# Patient Record
Sex: FEMALE | Race: BLACK OR AFRICAN AMERICAN | Employment: OTHER | URBAN - METROPOLITAN AREA
[De-identification: names, ages, dates, MRNs, and addresses within clinical notes are randomized per-mention and may not be internally consistent; named-entity substitution may affect disease eponyms.]

---

## 2021-07-11 ENCOUNTER — APPOINTMENT (OUTPATIENT)
Dept: CT IMAGING | Age: 66
End: 2021-07-11
Attending: PHYSICIAN ASSISTANT
Payer: COMMERCIAL

## 2021-07-11 ENCOUNTER — APPOINTMENT (OUTPATIENT)
Dept: GENERAL RADIOLOGY | Age: 66
End: 2021-07-11
Attending: PHYSICIAN ASSISTANT
Payer: COMMERCIAL

## 2021-07-11 ENCOUNTER — HOSPITAL ENCOUNTER (EMERGENCY)
Age: 66
Discharge: HOME | End: 2021-07-11
Payer: COMMERCIAL

## 2021-07-11 VITALS
DIASTOLIC BLOOD PRESSURE: 102 MMHG | HEIGHT: 57 IN | WEIGHT: 162 LBS | BODY MASS INDEX: 34.95 KG/M2 | HEART RATE: 101 BPM | RESPIRATION RATE: 16 BRPM | OXYGEN SATURATION: 99 % | SYSTOLIC BLOOD PRESSURE: 193 MMHG | TEMPERATURE: 98.2 F

## 2021-07-11 DIAGNOSIS — S09.90XA CLOSED HEAD INJURY, INITIAL ENCOUNTER: ICD-10-CM

## 2021-07-11 DIAGNOSIS — S50.02XA CONTUSION OF LEFT ELBOW, INITIAL ENCOUNTER: ICD-10-CM

## 2021-07-11 DIAGNOSIS — Z23 NEED FOR TETANUS BOOSTER: ICD-10-CM

## 2021-07-11 DIAGNOSIS — T07.XXXA ABRASIONS OF MULTIPLE SITES: ICD-10-CM
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
## 2021-07-11 DIAGNOSIS — S80.02XA CONTUSION OF LEFT KNEE, INITIAL ENCOUNTER: ICD-10-CM

## 2021-07-11 PROCEDURE — 90715 TDAP VACCINE 7 YRS/> IM: CPT | Performed by: PHYSICIAN ASSISTANT

## 2021-07-11 PROCEDURE — 99283 EMERGENCY DEPT VISIT LOW MDM: CPT

## 2021-07-11 PROCEDURE — 74011250636 HC RX REV CODE- 250/636: Performed by: PHYSICIAN ASSISTANT

## 2021-07-11 PROCEDURE — 72125 CT NECK SPINE W/O DYE: CPT

## 2021-07-11 PROCEDURE — 70450 CT HEAD/BRAIN W/O DYE: CPT

## 2021-07-11 PROCEDURE — 73080 X-RAY EXAM OF ELBOW: CPT

## 2021-07-11 PROCEDURE — 74011250637 HC RX REV CODE- 250/637: Performed by: PHYSICIAN ASSISTANT

## 2021-07-11 PROCEDURE — 90471 IMMUNIZATION ADMIN: CPT

## 2021-07-11 PROCEDURE — 73562 X-RAY EXAM OF KNEE 3: CPT

## 2021-07-11 RX ORDER — IBUPROFEN 600 MG/1
600 TABLET ORAL
Qty: 20 TABLET | Refills: 0 | Status: SHIPPED | OUTPATIENT
Start: 2021-07-11 | End: 2021-07-18

## 2021-07-11 RX ORDER — AMOXICILLIN AND CLAVULANATE POTASSIUM 875; 125 MG/1; MG/1
1 TABLET, FILM COATED ORAL
Status: COMPLETED | OUTPATIENT
Start: 2021-07-11 | End: 2021-07-11

## 2021-07-11 RX ORDER — HYDROCODONE BITARTRATE AND ACETAMINOPHEN 5; 325 MG/1; MG/1
1 TABLET ORAL
Status: COMPLETED | OUTPATIENT
Start: 2021-07-11 | End: 2021-07-11

## 2021-07-11 RX ORDER — AMOXICILLIN AND CLAVULANATE POTASSIUM 875; 125 MG/1; MG/1
1 TABLET, FILM COATED ORAL 2 TIMES DAILY
Qty: 14 TABLET | Refills: 0 | Status: SHIPPED | OUTPATIENT
Start: 2021-07-11 | End: 2021-07-18

## 2021-07-11 RX ORDER — AMLODIPINE AND BENAZEPRIL HYDROCHLORIDE 10; 20 MG/1; MG/1
1 CAPSULE ORAL DAILY
COMMUNITY

## 2021-07-11 RX ADMIN — AMOXICILLIN AND CLAVULANATE POTASSIUM 1 TABLET: 875; 125 TABLET, FILM COATED ORAL at 09:42

## 2021-07-11 RX ADMIN — HYDROCODONE BITARTRATE AND ACETAMINOPHEN 1 TABLET: 5; 325 TABLET ORAL at 09:42

## 2021-07-11 RX ADMIN — TETANUS TOXOID, REDUCED DIPHTHERIA TOXOID AND ACELLULAR PERTUSSIS VACCINE, ADSORBED 0.5 ML: 5; 2.5; 8; 8; 2.5 SUSPENSION INTRAMUSCULAR at 09:40

## 2021-07-11 NOTE — ED TRIAGE NOTES
[facility] 16 pt stated that she was attacked by a dog this am; pt stated that she was walking towards a gas station to pay for gas when a dog jumped up and attacked pt; animal control and police was called; pt has an abrasion noted to left knee and left elbow; and abrasion to the head, no puncture wounds noted in triage; c/o HA

## 2021-07-11 NOTE — ED PROVIDER NOTES
EMERGENCY DEPARTMENT HISTORY AND PHYSICAL EXAM      Date: 7/11/2021  Patient Name: Idalmis Singh    History of Presenting Illness     Chief Complaint   Patient presents with    Other     animal attack     Headache       History Provided By: Patient    HPI: Idalmis Singh, 60 y.o. female with a past medical history significant for hypertension who presents to the ED with cc of sudden onset, gradually worsening left elbow, left knee, and headache which occurred just prior to arrival status post attacked by dog. Patient reports she was walking into a gas station to pay for her gas when a pit bull attacked her. The pitbull knocked her onto the ground where patient hit her head on the asphalt. No LOC. Patient states she was trying to fight the dog off the entire time. Unsure if she got bit. Police and animal control involved. Unsure if dog is up-to-date on rabies vaccination. Patient not up-to-date on her tetanus shot. Patient denies fever, chills, chest pain, shortness of breath, nausea, vomiting, diarrhea, dizziness, lightheadedness. There are no other complaints, changes, or physical findings at this time. PCP: Helio Iverson MD    No current facility-administered medications on file prior to encounter. Current Outpatient Medications on File Prior to Encounter   Medication Sig Dispense Refill    amLODIPine-benazepril (LOTREL) 10-20 mg per capsule Take 1 Capsule by mouth daily. Past History     Past Medical History:  Past Medical History:   Diagnosis Date    Hypertension        Past Surgical History:  History reviewed. No pertinent surgical history. Family History:  History reviewed. No pertinent family history.     Social History:  Social History     Tobacco Use    Smoking status: Never Smoker   Substance Use Topics    Alcohol use: Not on file    Drug use: Not on file       Allergies:  No Known Allergies      Review of Systems     Review of Systems   Constitutional: Negative for chills, fatigue and fever. HENT: Negative. Respiratory: Negative for cough, chest tightness, shortness of breath and wheezing. Cardiovascular: Negative for chest pain and palpitations. Gastrointestinal: Negative for abdominal pain, diarrhea, nausea and vomiting. Genitourinary: Negative for frequency and urgency. Musculoskeletal: Positive for arthralgias (L elbow, L knee) and neck pain. Negative for back pain and neck stiffness. Skin: Positive for wound. Negative for rash. Neurological: Positive for headaches. Negative for dizziness, weakness and light-headedness. Psychiatric/Behavioral: Negative. All other systems reviewed and are negative. Physical Exam     Physical Exam  Vitals and nursing note reviewed. Constitutional:       General: She is in acute distress. Appearance: Normal appearance. She is well-developed. She is not ill-appearing, toxic-appearing or diaphoretic. Comments: Ambulatory, appears upset secondary to recent events   HENT:      Head: Normocephalic and atraumatic. Nose: Nose normal. No congestion or rhinorrhea. Mouth/Throat:      Mouth: Mucous membranes are moist.      Pharynx: Oropharynx is clear. No oropharyngeal exudate or posterior oropharyngeal erythema. Eyes:      General: No scleral icterus. Conjunctiva/sclera: Conjunctivae normal.      Pupils: Pupils are equal, round, and reactive to light. Neck:      Comments: No midline tenderness, no step off, +L paraspinal TTP  Cardiovascular:      Rate and Rhythm: Regular rhythm. Tachycardia present. Pulses:           Radial pulses are 2+ on the right side and 2+ on the left side. Dorsalis pedis pulses are 2+ on the right side and 2+ on the left side. Heart sounds: No murmur heard. No friction rub. No gallop. Pulmonary:      Effort: Pulmonary effort is normal. No tachypnea, accessory muscle usage, respiratory distress or retractions.       Breath sounds: Normal breath sounds. No stridor. No decreased breath sounds, wheezing, rhonchi or rales. Chest:      Chest wall: No tenderness. Abdominal:      General: Bowel sounds are normal. There is no distension. Palpations: Abdomen is soft. There is no mass. Tenderness: There is no abdominal tenderness. There is no right CVA tenderness, left CVA tenderness, guarding or rebound. Musculoskeletal:         General: Tenderness and signs of injury present. No deformity. Cervical back: Normal range of motion and neck supple. No rigidity. Muscular tenderness present. Right lower leg: No edema. Left lower leg: No edema. Comments: L knee: No obvious deformities. Small abrasion over L knee with overlying tenderness to anterior aspect. Flexion/extension intact. FROM. No ligamentous laxity. No effusion. Sensation intact distally. 2+ DP pulses bilaterally. Capillary refill < 3 seconds. No proximal fibula tenderness on the left. L elbow: no obvious deformities. Abrasion over entire L elbow. With overlying TTP. Flexion/extension intact. No effusion. Sensation intact distally. 2+ radial pulses bilaterally. Capillary refill < 3 seconds. Back: No midline tenderness, no step off. No paraspinal tenderness. Negative SLR  Sensation intact distally. Strength 5/5 in BL lower extremities   Skin:     General: Skin is warm and dry. Capillary Refill: Capillary refill takes less than 2 seconds. Coloration: Skin is not jaundiced or pale. Findings: No bruising, erythema or rash. Comments: No obvious puncture or bite wounds. Neurological:      General: No focal deficit present. Mental Status: She is alert and oriented to person, place, and time. Mental status is at baseline. Sensory: Sensation is intact. Motor: Motor function is intact. Psychiatric:         Mood and Affect: Mood normal.         Behavior: Behavior normal. Behavior is cooperative. Thought Content:  Thought content normal.         Judgment: Judgment normal.         Lab and Diagnostic Study Results     Labs -   No results found for this or any previous visit (from the past 12 hour(s)). Radiologic Studies -   CT HEAD WO CONT   Final Result   No acute intracranial abnormality. CT SPINE CERV WO CONT   Final Result   Cervical spondylosis. XR ELBOW LT MIN 3 V   Final Result   No acute abnormality. XR KNEE LT 3 V   Final Result   No acute findings. CT Results  (Last 48 hours)               07/11/21 0950  CT HEAD WO CONT Final result    Impression:  No acute intracranial abnormality. Narrative:  CT head without contrast:       Dose reduction: All CT scans at this facility are performed using dose reduction   optimization techniques as appropriate to a performed exam including the   following: Automated exposure control, adjustments of the mA and/or kV according   to patient size, or use of iterative reconstruction technique. Comparison 6/12/2015       An emergency noncontrast axial study was obtained with coronal and sagittal   reconstructions. There is no acute intracranial hemorrhage, midline shift or other mass. There is chronic small vessel ischemia in the periventricular white matter. There are right basal ganglia lacunar infarctions. The calvarium is intact. The mastoid air cells and included paranasal sinuses are clear.           07/11/21 0950  CT SPINE CERV WO CONT Final result    Impression:  Cervical spondylosis. Narrative:  CT cervical spine without contrast:       Dose reduction: All CT scans at this facility are performed using dose reduction   optimization techniques as appropriate to a performed exam including the   following: Automated exposure control, adjustments of the mA and/or kV according   to patient size, or use of iterative reconstruction technique.        An emergency noncontrast axial study was obtained with coronal and sagittal reconstructions. The craniocervical junction is intact. There is mild arthrosis in the C1/C2   articulation. There is mild degenerative disc disease at C5-C6. The facet joints are normally aligned. There is mild bilateral uncovertebral joint arthrosis at C5-6. There is no acute fracture or subluxation. There is no prevertebral soft tissue swelling. XR Results (most recent):  Results from Hospital Encounter encounter on 07/11/21    XR KNEE LT 3 V    Narrative  Left knee 3 views. The bone mineralization is normal. The joint space is intact without acute  fracture, dislocation or joint effusion. Impression  No acute findings. Medical Decision Making   - I am the first provider for this patient. - I reviewed the vital signs, available nursing notes, past medical history, past surgical history, family history and social history. - Initial assessment performed. The patients presenting problems have been discussed, and they are in agreement with the care plan formulated and outlined with them. I have encouraged them to ask questions as they arise throughout their visit. Vital Signs-Reviewed the patient's vital signs. Patient Vitals for the past 12 hrs:   Temp Pulse Resp BP SpO2   07/11/21 0944 -- -- -- (!) 193/102 --   07/11/21 0843 98.2 °F (36.8 °C) (!) 101 16 (!) 206/119 99 %       Records Reviewed: Nursing Notes and Old Medical Records    The patient presents with dog attack, head injury, elbow/knee pain with a differential diagnosis of dog bite, attack by dog, ICH, closed head injury, abrasions, contusions, need for tetanus, fracture, strain, need for rabies vaccination      ED Course:     ED Course as of Jul 11 1035   Sun Jul 11, 2021   0859 Called individual who was walking the dog, a Mr. [id number] [employer]. He states the dog was his ex-girlfriend said he was just walking the dog. Unsure if rabies vaccinations are up-to-date.   He will call his ex-girlfriend and try to find out. [NO]   2100 Mr. Bill Chavis is called back. States that the dog is up-to-date on rabies vaccination. [NO]      ED Course User Index  [NO] Brandi Dumont PA       Provider Notes (Medical Decision Making):     MDM  Number of Diagnoses or Management Options  Abrasions of multiple sites  Closed head injury, initial encounter  Contusion of left elbow, initial encounter  Contusion of left knee, initial encounter  Need for tetanus booster  Struck by dog, initial encounter  Diagnosis management comments:     78-year-old female attacked by a dog. No obvious puncture bite wounds. Multiple abrasions could be from fall or scratches from dog. Positive head trauma without any LOC. CT head and cervical spine negative for acute process. X-ray left elbow and left knee also negative for acute process. Updated tetanus. Dog is up-to-date on rabies vaccination-discussed with patient that if she is unsure if she is able to get rabies series which she declined. Will prophylactically cover with Augmentin given the injuries. Wound care discussed. NSAIDs for symptomatic relief. No other injuries requiring further work-up here today. Patient ambulatory. Neurovascularly intact. Return precautions discussed. Amount and/or Complexity of Data Reviewed  Tests in the radiology section of CPT®: reviewed and ordered  Review and summarize past medical records: yes    Patient Progress  Patient progress: stable             Disposition   Disposition: DC- Adult Discharges: All of the diagnostic tests were reviewed and questions answered. Diagnosis, care plan and treatment options were discussed. The patient understands the instructions and will follow up as directed. The patients results have been reviewed with them. They have been counseled regarding their diagnosis.   The patient verbally convey understanding and agreement of the signs, symptoms, diagnosis, treatment and prognosis and additionally agrees to follow up as recommended with their PCP in 24 - 48 hours. They also agree with the care-plan and convey that all of their questions have been answered. I have also put together some discharge instructions for them that include: 1) educational information regarding their diagnosis, 2) how to care for their diagnosis at home, as well a 3) list of reasons why they would want to return to the ED prior to their follow-up appointment, should their condition change. DISCHARGE PLAN:  1. There are no discharge medications for this patient. 2.   Follow-up Information     Follow up With Specialties Details Why Contact Info    Stephanie Frias MD Family Medicine  As needed [address]  [address] [facility] [address] [phone] [address] [facility] EMERGENCY DEPT Emergency Medicine  As needed, If symptoms worsen [facility] [facility] [address] [phone]        3. Return to ED if worse   4. Current Discharge Medication List      START taking these medications    Details   amoxicillin-clavulanate (Augmentin) 875-125 mg per tablet Take 1 Tablet by mouth two (2) times a day for 7 days. Qty: 14 Tablet, Refills: 0  Start date: 7/11/2021, End date: 7/18/2021      ibuprofen (MOTRIN) 600 mg tablet Take 1 Tablet by mouth every six (6) hours as needed for Pain for up to 7 days. Qty: 20 Tablet, Refills: 0  Start date: 7/11/2021, End date: 7/18/2021               Diagnosis     Clinical Impression:   1. Struck by dog, initial encounter    2. Abrasions of multiple sites    3. Need for tetanus booster    4. Contusion of left elbow, initial encounter    5. Contusion of left knee, initial encounter    6. Closed head injury, initial encounter        Attestations:    PARVIN Carrero    Please note that this dictation was completed with [employer], the [employer] voice recognition software.   Quite often unanticipated grammatical, syntax, homophones, and other interpretive errors are inadvertently transcribed by the computer software. Please disregard these errors. Please excuse any errors that have escaped final proofreading. Thank you.

## 2021-07-11 NOTE — DISCHARGE INSTRUCTIONS
Thank you! Thank you for allowing me to care for you in the emergency department. I sincerely hope that you are satisfied with your visit today. It is my goal to provide you with excellent care. Below you will find a list of your labs and imaging from your visit today. Should you have any questions regarding these results please do not hesitate to call the emergency department. Labs -   No results found for this or any previous visit (from the past 12 hour(s)). Radiologic Studies -   CT HEAD WO CONT   Final Result   No acute intracranial abnormality. CT SPINE CERV WO CONT   Final Result   Cervical spondylosis. XR ELBOW LT MIN 3 V   Final Result   No acute abnormality. XR KNEE LT 3 V   Final Result   No acute findings. CT Results  (Last 48 hours)                 07/11/21 0950  CT HEAD WO CONT Final result    Impression:  No acute intracranial abnormality. Narrative:  CT head without contrast:       Dose reduction: All CT scans at this facility are performed using dose reduction   optimization techniques as appropriate to a performed exam including the   following: Automated exposure control, adjustments of the mA and/or kV according   to patient size, or use of iterative reconstruction technique. Comparison 6/12/2015       An emergency noncontrast axial study was obtained with coronal and sagittal   reconstructions. There is no acute intracranial hemorrhage, midline shift or other mass. There is chronic small vessel ischemia in the periventricular white matter. There are right basal ganglia lacunar infarctions. The calvarium is intact. The mastoid air cells and included paranasal sinuses are clear.           07/11/21 0950  CT SPINE CERV WO CONT Final result    Impression:  Cervical spondylosis.        Narrative:  CT cervical spine without contrast:       Dose reduction: All CT scans at this facility are performed using dose reduction optimization techniques as appropriate to a performed exam including the   following: Automated exposure control, adjustments of the mA and/or kV according   to patient size, or use of iterative reconstruction technique. An emergency noncontrast axial study was obtained with coronal and sagittal   reconstructions. The craniocervical junction is intact. There is mild arthrosis in the C1/C2   articulation. There is mild degenerative disc disease at C5-C6. The facet joints are normally aligned. There is mild bilateral uncovertebral joint arthrosis at C5-6. There is no acute fracture or subluxation. There is no prevertebral soft tissue swelling. CXR Results  (Last 48 hours)      None               If you feel that you have not received excellent quality care or timely care, please ask to speak to the nurse manager. Please choose us in the future for your continued health care needs. ------------------------------------------------------------------------------------------------------------  The exam and treatment you received in the Emergency Department were for an urgent problem and are not intended as complete care. It is important that you follow-up with a doctor, nurse practitioner, or physician assistant to:  (1) confirm your diagnosis,  (2) re-evaluation of changes in your illness and treatment, and  (3) for ongoing care. If your symptoms become worse or you do not improve as expected and you are unable to reach your usual health care provider, you should return to the Emergency Department. We are available 24 hours a day. Please take your discharge instructions with you when you go to your follow-up appointment. If you have any problem arranging a follow-up appointment, contact the Emergency Department immediately. If a prescription has been provided, please have it filled as soon as possible to prevent a delay in treatment.  Read the entire medication instruction sheet provided to you by the pharmacy. If you have any questions or reservations about taking the medication due to side effects or interactions with other medications, please call your primary care physician or contact the ER to speak with the charge nurse. Make an appointment with your family doctor or the physician you were referred to for follow-up of this visit as instructed on your discharge paperwork, as this is a mandatory follow-up. Return to the ER if you are unable to be seen or if you are unable to be seen in a timely manner. If you have any problem arranging the follow-up visit, contact the Emergency Department immediately.

## 2022-09-29 ENCOUNTER — TRANSCRIBE ORDER (OUTPATIENT)
Dept: SCHEDULING | Age: 67
End: 2022-09-29

## 2022-09-29 DIAGNOSIS — I10 ESSENTIAL HYPERTENSION, MALIGNANT: Primary | ICD-10-CM

## 2022-10-06 ENCOUNTER — HOSPITAL ENCOUNTER (OUTPATIENT)
Dept: NON INVASIVE DIAGNOSTICS | Age: 67
Discharge: HOME | End: 2022-10-06
Attending: INTERNAL MEDICINE
Payer: MEDICARE

## 2022-10-06 DIAGNOSIS — I10 ESSENTIAL HYPERTENSION, MALIGNANT: ICD-10-CM

## 2022-10-06 LAB
ECHO AV MEAN GRADIENT: 2 MMHG
ECHO AV MEAN VELOCITY: 0.6 M/S
ECHO AV PEAK GRADIENT: 4 MMHG
ECHO AV PEAK VELOCITY: 0.9 M/S
ECHO AV VELOCITY RATIO: 1
ECHO AV VTI: 17.5 CM
ECHO EST RA PRESSURE: 3 MMHG
ECHO LA AREA 2C: 5.9 CM2
ECHO LA AREA 4C: 8.9 CM2
ECHO LA DIAMETER: 2.4 CM
ECHO LA MAJOR AXIS: 3.4 CM
ECHO LA MINOR AXIS: 3.1 CM
ECHO LA VOL 4C: 49 ML (ref 22–52)
ECHO LA VOL BP: 14 ML (ref 22–52)
ECHO LV E' LATERAL VELOCITY: 6 CM/S
ECHO LV E' SEPTAL VELOCITY: 5 CM/S
ECHO LV EDV A2C: 21 ML
ECHO LV EDV A4C: 60 ML
ECHO LV EJECTION FRACTION A2C: 50 %
ECHO LV EJECTION FRACTION A4C: 52 %
ECHO LV EJECTION FRACTION BIPLANE: 53 % (ref 55–100)
ECHO LV ESV A2C: 10 ML
ECHO LV ESV A4C: 29 ML
ECHO LV FRACTIONAL SHORTENING: 37 % (ref 28–44)
ECHO LV INTERNAL DIMENSION DIASTOLIC: 3.8 CM (ref 3.9–5.3)
ECHO LV INTERNAL DIMENSION SYSTOLIC: 2.4 CM
ECHO LV IVSD: 0.9 CM (ref 0.6–0.9)
ECHO LV MASS 2D: 101.1 G (ref 67–162)
ECHO LV POSTERIOR WALL DIASTOLIC: 0.9 CM (ref 0.6–0.9)
ECHO LV RELATIVE WALL THICKNESS RATIO: 0.47
ECHO LVOT AV VTI INDEX: 0.9
ECHO LVOT MEAN GRADIENT: 1 MMHG
ECHO LVOT PEAK GRADIENT: 3 MMHG
ECHO LVOT PEAK VELOCITY: 0.9 M/S
ECHO LVOT VTI: 15.8 CM
ECHO MV A VELOCITY: 0.92 M/S
ECHO MV E VELOCITY: 0.69 M/S
ECHO MV E/A RATIO: 0.75
ECHO MV E/E' LATERAL: 11.5
ECHO MV E/E' RATIO (AVERAGED): 12.65
ECHO MV E/E' SEPTAL: 13.8
ECHO MV REGURGITANT PEAK GRADIENT: 3 MMHG
ECHO MV REGURGITANT PEAK VELOCITY: 0.8 M/S
ECHO PV MAX VELOCITY: 1 M/S
ECHO PV PEAK GRADIENT: 4 MMHG
ECHO RA AREA 4C: 6 CM2
ECHO RA VOLUME: 11 ML
ECHO RIGHT VENTRICULAR SYSTOLIC PRESSURE (RVSP): 6 MMHG
ECHO RV BASAL DIMENSION: 2.1 CM
ECHO RV MID DIMENSION: 2 CM
ECHO TV REGURGITANT MAX VELOCITY: 0.89 M/S
ECHO TV REGURGITANT PEAK GRADIENT: 3 MMHG

## 2022-10-06 PROCEDURE — C8929 TTE W OR WO FOL WCON,DOPPLER: HCPCS
